# Patient Record
Sex: FEMALE
[De-identification: names, ages, dates, MRNs, and addresses within clinical notes are randomized per-mention and may not be internally consistent; named-entity substitution may affect disease eponyms.]

---

## 2024-09-16 ENCOUNTER — APPOINTMENT (OUTPATIENT)
Dept: GASTROENTEROLOGY | Facility: CLINIC | Age: 54
End: 2024-09-16
Payer: COMMERCIAL

## 2024-09-16 VITALS
HEIGHT: 64 IN | TEMPERATURE: 97.2 F | SYSTOLIC BLOOD PRESSURE: 122 MMHG | WEIGHT: 253.4 LBS | DIASTOLIC BLOOD PRESSURE: 80 MMHG | RESPIRATION RATE: 18 BRPM | BODY MASS INDEX: 43.26 KG/M2 | OXYGEN SATURATION: 99 % | HEART RATE: 98 BPM

## 2024-09-16 DIAGNOSIS — R11.10 VOMITING, UNSPECIFIED: ICD-10-CM

## 2024-09-16 DIAGNOSIS — R19.5 OTHER FECAL ABNORMALITIES: ICD-10-CM

## 2024-09-16 PROBLEM — Z00.00 ENCOUNTER FOR PREVENTIVE HEALTH EXAMINATION: Status: ACTIVE | Noted: 2024-09-16

## 2024-09-16 PROCEDURE — G2211 COMPLEX E/M VISIT ADD ON: CPT | Mod: NC

## 2024-09-16 PROCEDURE — 99205 OFFICE O/P NEW HI 60 MIN: CPT

## 2024-09-16 RX ORDER — POLYETHYLENE GLYCOL 3350 AND ELECTROLYTES WITH LEMON FLAVOR 236; 22.74; 6.74; 5.86; 2.97 G/4L; G/4L; G/4L; G/4L; G/4L
236 POWDER, FOR SOLUTION ORAL
Qty: 1 | Refills: 0 | Status: ACTIVE | COMMUNITY
Start: 2024-09-16 | End: 1900-01-01

## 2024-09-16 NOTE — ASSESSMENT
[FreeTextEntry1] : 1. Positive Cologuard, Regurgitation - EGD/colonoscopy at [facility] with 2-day prep and Miralax BID 1 week before procedure - Hold tirzepatide 1 week before and metformin day of procedure - Obtain cardiology clearance - Pt to email note from Dr. Geller to see if any other specific things to look out for during EGD - Risks/benefits and need for adult chaperone day-of procedure discussed  2. Constipation - Start Miralax daily

## 2024-09-16 NOTE — END OF VISIT
[] : Fellow [FreeTextEntry3] : Agree w/ above. Pt on GLP1a w/ constipation, recommendations as above w/ addition of miraLAX.  Colonoscopy in hospital w/ 2 day prep given hx of poor prep and subsequent positive cologuard. EGD at the same time for hx of H pylori and s/p gastric sleeve w/ evaluation of reflux consequences. Detaills of both procedures, as well as constipation therapy discussed at length.  [Time Spent: ___ minutes] : I have spent [unfilled] minutes of time on the encounter which excludes teaching and separately reported services.

## 2024-09-16 NOTE — PHYSICAL EXAM
[Alert] : alert [No Respiratory Distress] : no respiratory distress [None] : no edema [No Masses] : no abdominal mass palpated

## 2024-09-16 NOTE — HISTORY OF PRESENT ILLNESS
[FreeTextEntry1] : 54F h/o obesity (s/p sleeve gastrectomy , on tirzepatide), JUAN ANTONIO (on CPAP), hemorrhoids p/w CRC screening.  Colonoscopy by Dr. Tomeka Geller 3/2024 with incomplete prep, then had Cologuard which was positive. Constipated on while on tirzepatide, having 3 BMs per week but +straining while on Metamucil once daily. Also has regurgitation with specific foods (eg. rice cakes) since being on tirzepatide.  High coronary calcium score, had normal stress test, and awaiting CCTA. Palpitations if not wearing CPAP.   Denies abd pain, melena, BRBPR, hematemesis, reflux, dysphagia, odynophagia, or changes in bowel habits. Denies hx of MI, CVA, seizure, DVT/PE.  Labs from 2024 reviewed with normal CBC, BMP, A1c 5.9%.   PMH: obesity (on tirzepatide), JUAN ANTONIO (on CPAP), HTN, HLD, hemorrhoids, fibroids PSH: sleeve gastrectomy (2022), , myomectomy Meds: ASA, atorvastatin, losartan-HCTZ, metformin, tirzepatide, fezolinetant FH: Father with pancreatic cancer (no others with pancreatic cancer). Otherwise denies FH of GI malignancies or other GI diseases. SH: No tobacco, rare alcohol, no drugs. .

## 2024-09-30 ENCOUNTER — TRANSCRIPTION ENCOUNTER (OUTPATIENT)
Age: 54
End: 2024-09-30

## 2024-10-15 RX ORDER — TIRZEPATIDE 5 MG/.5ML
INJECTION, SOLUTION SUBCUTANEOUS
Refills: 0 | Status: ACTIVE | COMMUNITY

## 2024-10-15 RX ORDER — LOSARTAN POTASSIUM AND HYDROCHLOROTHIAZIDE 12.5; 1 MG/1; MG/1
100-12.5 TABLET ORAL
Refills: 0 | Status: ACTIVE | COMMUNITY

## 2024-10-15 RX ORDER — METOPROLOL TARTRATE 75 MG/1
TABLET, FILM COATED ORAL
Refills: 0 | Status: ACTIVE | COMMUNITY

## 2024-10-15 RX ORDER — METFORMIN HYDROCHLORIDE 625 MG/1
TABLET ORAL
Refills: 0 | Status: ACTIVE | COMMUNITY

## 2024-10-15 RX ORDER — ATORVASTATIN CALCIUM 80 MG/1
TABLET, FILM COATED ORAL
Refills: 0 | Status: ACTIVE | COMMUNITY

## 2024-10-16 ENCOUNTER — NON-APPOINTMENT (OUTPATIENT)
Age: 54
End: 2024-10-16

## 2024-10-31 ENCOUNTER — APPOINTMENT (OUTPATIENT)
Dept: GASTROENTEROLOGY | Facility: HOSPITAL | Age: 54
End: 2024-10-31

## 2024-10-31 ENCOUNTER — RESULT REVIEW (OUTPATIENT)
Age: 54
End: 2024-10-31

## 2024-10-31 ENCOUNTER — OUTPATIENT (OUTPATIENT)
Dept: OUTPATIENT SERVICES | Facility: HOSPITAL | Age: 54
LOS: 1 days | Discharge: HOME | End: 2024-10-31
Payer: COMMERCIAL

## 2024-10-31 VITALS
WEIGHT: 253.09 LBS | RESPIRATION RATE: 18 BRPM | OXYGEN SATURATION: 99 % | TEMPERATURE: 97 F | HEIGHT: 64 IN | DIASTOLIC BLOOD PRESSURE: 81 MMHG | SYSTOLIC BLOOD PRESSURE: 109 MMHG | HEART RATE: 73 BPM

## 2024-10-31 PROCEDURE — 88305 TISSUE EXAM BY PATHOLOGIST: CPT | Mod: 26

## 2024-10-31 PROCEDURE — 43235 EGD DIAGNOSTIC BRUSH WASH: CPT

## 2024-10-31 PROCEDURE — 45380 COLONOSCOPY AND BIOPSY: CPT

## 2024-10-31 PROCEDURE — 88305 TISSUE EXAM BY PATHOLOGIST: CPT

## 2024-10-31 PROCEDURE — 45380 COLONOSCOPY AND BIOPSY: CPT | Mod: PT

## 2024-10-31 NOTE — PRE-ANESTHESIA EVALUATION ADULT - NSRADCARDRESULTSFT_GEN_ALL_CORE
Outside chart/data reviewed prior to procedure.  ECHO: Normal biventricular function, no hemodynamically significant valvulopathies

## 2024-10-31 NOTE — PRE-ANESTHESIA EVALUATION ADULT - NSANTHPMHFT_GEN_ALL_CORE
54F c JUAN ANTONIO (on CPAP), hemorrhoids, Obesity s/p sleeve gastrectomy in 2022 on Mounjaro 54F c JUAN ANTONIO (on CPAP), hemorrhoids, Obesity s/p sleeve gastrectomy in 2022 on Zepbound

## 2024-11-01 LAB — SURGICAL PATHOLOGY STUDY: SIGNIFICANT CHANGE UP

## 2024-11-04 ENCOUNTER — NON-APPOINTMENT (OUTPATIENT)
Age: 54
End: 2024-11-04

## (undated) DEVICE — FORCEP RADIAL JAW 4 240CM DISP